# Patient Record
Sex: MALE | Race: OTHER | Employment: FULL TIME | URBAN - METROPOLITAN AREA
[De-identification: names, ages, dates, MRNs, and addresses within clinical notes are randomized per-mention and may not be internally consistent; named-entity substitution may affect disease eponyms.]

---

## 2020-02-26 ENCOUNTER — HOSPITAL ENCOUNTER (OUTPATIENT)
Age: 40
Discharge: HOME | End: 2020-02-26

## 2020-02-26 PROCEDURE — 36415 COLL VENOUS BLD VENIPUNCTURE: CPT

## 2021-01-25 ENCOUNTER — HOSPITAL ENCOUNTER (EMERGENCY)
Age: 41
Discharge: HOME | End: 2021-01-25
Attending: EMERGENCY MEDICINE
Payer: OTHER GOVERNMENT

## 2021-01-25 ENCOUNTER — NURSE TRIAGE (OUTPATIENT)
Dept: OTHER | Facility: CLINIC | Age: 41
End: 2021-01-25

## 2021-01-25 ENCOUNTER — APPOINTMENT (OUTPATIENT)
Dept: ULTRASOUND IMAGING | Age: 41
End: 2021-01-25
Payer: OTHER GOVERNMENT

## 2021-01-25 VITALS
DIASTOLIC BLOOD PRESSURE: 70 MMHG | HEIGHT: 69 IN | WEIGHT: 210 LBS | SYSTOLIC BLOOD PRESSURE: 126 MMHG | BODY MASS INDEX: 31.1 KG/M2 | OXYGEN SATURATION: 96 % | TEMPERATURE: 98.6 F | HEART RATE: 80 BPM | RESPIRATION RATE: 16 BRPM

## 2021-01-25 DIAGNOSIS — M79.605 LEFT LEG PAIN: Primary | ICD-10-CM

## 2021-01-25 PROCEDURE — 93971 EXTREMITY STUDY: CPT

## 2021-01-25 PROCEDURE — 99283 EMERGENCY DEPT VISIT LOW MDM: CPT

## 2021-01-25 ASSESSMENT — PAIN DESCRIPTION - PAIN TYPE: TYPE: ACUTE PAIN

## 2021-01-25 NOTE — ED PROVIDER NOTES
[id number] [facility] ED  [address] [facility] [address]  Phone: [phone]  Fax: [phone]      eMERGENCY dEPARTMENT eNCOUnter      Pt Name: Dia Golden  MRN: [de-identified]  [place] 1980  Date of evaluation: 1/25/21      CHIEF COMPLAINT:  Chief Complaint   Patient presents with    Leg Pain     left leg pain for about 1 month, patient states pain is behind his knee, going into his calf and back thigh       HISTORY OF PRESENT ILLNESS    Dia Golden is a 46 y.o. male who presents with evaluation for orthopedic pain:    Location/Symptom:   L knee/leg pain  Timing/Onset:  ~ 1 month  Context/Setting:    Patient here at recommendation of PCP after he called her office to schedule appointment for approximately 1 months worth of atraumatic left posterior knee pain with some radiation to the calf and to the lower posterior thigh. Patient denies seeing any swelling or redness of this area. Patient denies any previous DVT or PE history. Patient has no pertinent orthopedic history of this left lower extremity. No paresthesias/focal weakness. He denies any present or preceding significant cardiac or respiratory symptoms. He denies any coagulopathy history, or recent history of travel or surgical procedures    Quality:   Achy  Duration:   constant  Modifying Factors: Worse with movement and weightbearing, better with rest  Severity:   Mild-moderate    Nursing Notes were reviewed. REVIEW OF SYSTEMS       Constitutional: Denies recent fever, chills. Eyes: No vision changes. Neck: No neck pain. Respiratory: Denies recent shortness of breath. Cardiac:  Denies recent chest pain. GI:  Denies abdominal pain/nausea/vomiting/diarrhea. : Denies dysuria. Musculoskeletal:   Per HPI  Neurologic:  No headache. No focal weakness. No paresthesias. Skin:  Denies any rash. Negative in 10 essential Systems except as mentioned above and in the HPI.       PAST MEDICAL HISTORY   PMH:  has no past medical history on file. Surgical History:  has no past surgical history on file. Social History:  reports that he has never smoked. He has never used smokeless tobacco. He reports that he does not use drugs. Family History: None  Psychiatric History: None    Allergies:has No Known Allergies. PHYSICAL EXAM     INITIAL VITALS: /70   Pulse 80   Temp 98.6 °F (37 °C) (Oral)   Resp 16   Ht 5' 9\" (1.753 m)   Wt 95.3 kg (210 lb)   SpO2 96%   BMI 31.01 kg/m²   Constitutional:  Well developed   Eyes:  Pupils equal/round  HENT:  Atraumatic, external ears normal, nose normal  Respiratory:  LCTA bilat, no W/R/R  Cardiovascular:  RRR with normal S1 and S2    Musculoskeletal:  TTP of left posterior knee generally, this extended to posteromedial calf as well. No erythema/edema/palpable chord/ecchymosis or warmth. 2+ DP on left. No anterior left knee TTP. NV intact distally. Integument:   No rash. Neurologic:  Alert & appropriate mentation/interaction, no focal deficits noted       DIAGNOSTIC RESULTS     EKG: All EKG's are interpreted by the Emergency Department Physician who either signs or Co-signs this chart in the absence of a cardiologist.  Not indicated    RADIOLOGY:   Reviewed the radiologist:  US DUP LOWER EXTREMITY LEFT KERRI   Final Result   No evidence of DVT in the left lower extremity. LABS:  Labs Reviewed - No data to display  Not indicated    EMERGENCY DEPARTMENT COURSE / MDM:     [id number]  Kerri Doppler ordered for LLE. TTP on exam but otherwise no signs or risk factors of DVT. [id number]  Attending discharged this patient after discussing all labwork/imaging results that were finalized. Treatment plan and recommended follow-up discussed with them as well. No orders of the defined types were placed in this encounter. CONSULTS:  None      FINAL IMPRESSION      1.  Left leg pain          DISPOSITION/PLAN:  DISPOSITION          PATIENT REFERRED TO:  No follow-up provider specified.     DISCHARGE MEDICATIONS:  New Prescriptions    No medications on file       (Please note that portions of this note were completed with a voice recognition program.  Efforts were made to edit the dictations but occasionally words are mis-transcribed.)    HAI Marcus PA-C  01/25/21 1502

## 2021-01-25 NOTE — TELEPHONE ENCOUNTER
makes it appear as pt is not [facility] ([facility]) PCP patient. Advised pt to seek care in [address]; pt v/u and is going to go to [facility]) ED. Care advice provided, patient verbalizes understanding; denies any other questions or concerns; instructed to call back for any new or worsening symptoms. Attention Provider: Thank you for allowing me to participate in the care of your patient. The patient was connected to triage in response to information provided to the [facility]. Please do not respond through this encounter as the response is not directed to a shared pool.

## 2021-01-25 NOTE — ED PROVIDER NOTES
[id number] [facility] ED  [address] [facility] [address]  Phone: [phone]  Fax: [phone]      Attending Physician Attestation    I performed a history and physical examination of the patient and discussed management with the mid level provider. I reviewed the mid level provider's note and agree with the documented findings and plan of care. Any areas of disagreement are noted on the chart. I was personally present for the key portions of any procedures. I have documented in the chart those procedures where I was not present during the key portions. I have reviewed the emergency nurses triage note. I agree with the chief complaint, past medical history, past surgical history, allergies, medications, social and family history as documented unless otherwise noted below. Documentation of the HPI, Physical Exam and Medical Decision Making performed by mid level providers is based on my personal performance of the HPI, PE and MDM. For Physician Assistant/ Nurse Practitioner cases/documentation I have personally evaluated this patient and have completed at least one if not all key elements of the E/M (history, physical exam, and MDM). Additional findings are as noted. CHIEF COMPLAINT       Chief Complaint   Patient presents with    Leg Pain     left leg pain for about 1 month, patient states pain is behind his knee, going into his calf and back thigh       DIAGNOSTIC RESULTS     LABS:  Labs Reviewed - No data to display    All other labs were within normal range or not returned as of this dictation. RADIOLOGY:  US DUP LOWER EXTREMITY LEFT KERRI   Final Result   No evidence of DVT in the left lower extremity.                EMERGENCY DEPARTMENT COURSE:   Vitals:    Vitals:    01/25/21 1606   BP: 126/70   Pulse: 80   Resp: 16   Temp: 98.6 °F (37 °C)   TempSrc: Oral   SpO2: 96%   Weight: 95.3 kg (210 lb)   Height: 5' 9\" (1.753 m)     -------------------------  BP: 126/70, Temp: 98.6 °F (37 °C), Pulse: 80, Resp: 16             PERTINENT ATTENDING PHYSICIAN COMMENTS:    42-year-old male here with left calf pain. Patient was referred here by his primary care doctor for evaluation for DVT. Patient denies history of venous thromboembolism. No recent travel. No recent surgery. No history of malignancy. No chest pains or shortness of breath. On physical exam, he does have calf tenderness to palpation, but no swelling noted. We did obtain a venous Doppler, which was negative for DVT.       (Please note that portions of this note were completed with a voice recognition program.  Efforts were made to edit the dictations but occasionally words are mis-transcribed.)    Tyshawn Lee MD  Attending Emergency Medicine Physician        Tyshawn Lee MD  01/25/21 9178

## 2021-01-29 ENCOUNTER — OFFICE VISIT (OUTPATIENT)
Dept: PRIMARY CARE CLINIC | Age: 41
End: 2021-01-29
Payer: OTHER GOVERNMENT

## 2021-01-29 VITALS
WEIGHT: 228 LBS | DIASTOLIC BLOOD PRESSURE: 78 MMHG | OXYGEN SATURATION: 97 % | TEMPERATURE: 98.2 F | BODY MASS INDEX: 34.56 KG/M2 | SYSTOLIC BLOOD PRESSURE: 124 MMHG | HEIGHT: 68 IN | HEART RATE: 75 BPM

## 2021-01-29 DIAGNOSIS — Z00.00 ENCOUNTER FOR WELL ADULT EXAM WITHOUT ABNORMAL FINDINGS: Primary | ICD-10-CM

## 2021-01-29 DIAGNOSIS — Z13.220 SCREENING FOR HYPERLIPIDEMIA: ICD-10-CM

## 2021-01-29 DIAGNOSIS — Z13.1 SCREENING FOR DIABETES MELLITUS: ICD-10-CM

## 2021-01-29 DIAGNOSIS — Z11.59 ENCOUNTER FOR HCV SCREENING TEST FOR LOW RISK PATIENT: ICD-10-CM

## 2021-01-29 PROCEDURE — 99385 PREV VISIT NEW AGE 18-39: CPT | Performed by: NURSE PRACTITIONER

## 2021-01-29 SDOH — ECONOMIC STABILITY: TRANSPORTATION INSECURITY
IN THE PAST 12 MONTHS, HAS THE LACK OF TRANSPORTATION KEPT YOU FROM MEDICAL APPOINTMENTS OR FROM GETTING MEDICATIONS?: NOT ASKED

## 2021-01-29 SDOH — ECONOMIC STABILITY: FOOD INSECURITY: WITHIN THE PAST 12 MONTHS, YOU WORRIED THAT YOUR FOOD WOULD RUN OUT BEFORE YOU GOT MONEY TO BUY MORE.: NEVER TRUE

## 2021-01-29 SDOH — ECONOMIC STABILITY: FOOD INSECURITY: WITHIN THE PAST 12 MONTHS, THE FOOD YOU BOUGHT JUST DIDN'T LAST AND YOU DIDN'T HAVE MONEY TO GET MORE.: NEVER TRUE

## 2021-01-29 SDOH — HEALTH STABILITY: MENTAL HEALTH: HOW MANY STANDARD DRINKS CONTAINING ALCOHOL DO YOU HAVE ON A TYPICAL DAY?: 5 OR 6

## 2021-01-29 ASSESSMENT — ENCOUNTER SYMPTOMS
ALLERGIC/IMMUNOLOGIC NEGATIVE: 1
EYES NEGATIVE: 1
RESPIRATORY NEGATIVE: 1
GASTROINTESTINAL NEGATIVE: 1

## 2021-01-29 ASSESSMENT — PATIENT HEALTH QUESTIONNAIRE - PHQ9
SUM OF ALL RESPONSES TO PHQ QUESTIONS 1-9: 0
SUM OF ALL RESPONSES TO PHQ QUESTIONS 1-9: 0

## 2021-01-29 NOTE — PROGRESS NOTES
[facility] [address] [place] [address]  [place] [address]  Dept: [phone]     Patient is here to establish care. Here for evaluation of the following medical concerns:New Patient    Racquel Trujillo is a 46 y.o. male who presents to the office today for a first visit and to establish a relationship with a new primary care provider. Pt here to establish care. No chronic medical conditions, no daily medications. UTD on all vaccines. Has not had labwork in at least a few years, mentioned liver enzymes were elevated about 5 years ago. No other concerns today, will get lab work. There are no active problems to display for this patient. Patient's BMI is Body mass index is 34.67 kg/m². Social History     Tobacco Use    Smoking status: Never Smoker    Smokeless tobacco: Never Used   Substance Use Topics    Alcohol use: Yes     Alcohol/week: 6.0 standard drinks     Types: 6 Cans of beer per week     Frequency: 2-3 times a week     Drinks per session: 5 or 6    Drug use: Never      History reviewed. No pertinent past medical history. PHQ-9 Total Score: 0 (1/29/2021  1:10 PM)     Immunization:  Immunization History   Administered Date(s) Administered    Influenza Virus Vaccine 10/01/2020    Tdap (Boostrix, Adacel) 10/01/2016     History reviewed. No pertinent surgical history. Family History   Problem Relation Age of Onset    High Cholesterol Father     Heart Disease Father      No current outpatient medications on file. No current facility-administered medications for this visit. The patient's past medical, surgical, social, and family history as well as his current medications and allergies were reviewed as documented in today's encounter. Review of Systems   Constitutional: Negative. HENT: Negative. Eyes: Negative. Respiratory: Negative. Cardiovascular: Negative. Gastrointestinal: Negative. Endocrine: Negative.     Genitourinary: Negative. Musculoskeletal: Negative. Skin: Negative. Allergic/Immunologic: Negative. Neurological: Negative. Hematological: Negative. Psychiatric/Behavioral: Negative. All other systems reviewed and are negative. /78 (Site: Right Upper Arm, Position: Sitting, Cuff Size: Medium Adult)   Pulse 75   Temp 98.2 °F (36.8 °C) (Temporal)   Ht 5' 8\" (1.727 m)   Wt 228 lb (103.4 kg)   SpO2 97%   BMI 34.67 kg/m²   Physical Exam  Vitals signs and nursing note reviewed. Constitutional:       Appearance: Normal appearance. He is normal weight. HENT:      Right Ear: Tympanic membrane, ear canal and external ear normal.      Left Ear: Tympanic membrane, ear canal and external ear normal.      Nose: Nose normal.      Mouth/Throat:      Mouth: Mucous membranes are moist.      Pharynx: Oropharynx is clear. Eyes:      Extraocular Movements: Extraocular movements intact. Conjunctiva/sclera: Conjunctivae normal.      Pupils: Pupils are equal, round, and reactive to light. Neck:      Musculoskeletal: Normal range of motion. Cardiovascular:      Rate and Rhythm: Normal rate and regular rhythm. Pulses: Normal pulses. Heart sounds: Normal heart sounds. Pulmonary:      Effort: Pulmonary effort is normal.      Breath sounds: Normal breath sounds. Abdominal:      General: Abdomen is flat. Bowel sounds are normal.      Palpations: Abdomen is soft. Skin:     General: Skin is warm and dry. Capillary Refill: Capillary refill takes less than 2 seconds. Neurological:      General: No focal deficit present. Mental Status: He is alert.        No results found for: WBC, HGB, HCT, MCV, PLT  No results found for: NA, K, CL, CO2, BUN, CREATININE, GLUCOSE, CALCIUM   No results found for: ALT, AST, GGT, ALKPHOS, BILITOT  No results found for: TSHFT4, TSH  No results found for: CHOL  No results found for: TRIG  No results found for: HDL  No results found for: LDLCALC, LDLCHOLESTEROL  No results found for: CHOLHDLRATIO  No results found for: LABA1C  No results found for: ACTMLFNN32  No results found for: FOLATE  No results found for: IRON, TIBC, FERRITIN  No results found for: VITD25  I personally reviewed testing with patient. Otherwise labs within normal limits  ASSESSMENT AND PLAN  1. Screening for diabetes mellitus    - Comprehensive Metabolic Panel, Fasting; Future    2. Screening for hyperlipidemia    - Lipid Panel; Future    3. Encounter for HCV screening test for low risk patient    - Hepatitis C Antibody; Future    4. Encounter for well adult exam without abnormal findings       Orders Placed This Encounter   Procedures    Hepatitis C Antibody     Standing Status:   Future     Standing Expiration Date:   1/29/2022    Lipid Panel     Standing Status:   Future     Standing Expiration Date:   1/29/2022     Order Specific Question:   Is Patient Fasting?/# of Hours     Answer:   10 hours fasting    Comprehensive Metabolic Panel, Fasting     Standing Status:   Future     Standing Expiration Date:   1/29/2022     No orders of the defined types were placed in this encounter. Data Unavailable  Health Maintenance Due   Topic Date Due    Hepatitis C screen  1980      There are no discontinued medications. The patient's past medical, surgical, social, and family history as well as his   current medications and allergies were reviewed as documented in today's encounter. Medications, labs, diagnostic studies, consultations and follow-up as documented in this encounter. Return in about 1 year (around 1/29/2022). No future appointments. This note was completed by using the assistance of a speech-recognition program. However, inadvertent computerized transcription errors may be present. Although every effort was made to ensure accuracy, no guarantees can be provided that every mistake has been identified and corrected by editing.   Electronically signed by [facility] Alex Flores CNP on 1/29/2021 at 1:46 PM

## 2021-01-30 ENCOUNTER — HOSPITAL ENCOUNTER (OUTPATIENT)
Age: 41
Discharge: HOME | End: 2021-01-30
Payer: OTHER GOVERNMENT

## 2021-01-30 DIAGNOSIS — Z13.1 SCREENING FOR DIABETES MELLITUS: ICD-10-CM

## 2021-01-30 DIAGNOSIS — Z13.220 SCREENING FOR HYPERLIPIDEMIA: ICD-10-CM

## 2021-01-30 DIAGNOSIS — Z11.59 ENCOUNTER FOR HCV SCREENING TEST FOR LOW RISK PATIENT: ICD-10-CM

## 2021-01-30 LAB
ALBUMIN SERPL-MCNC: 4.3 G/DL (ref 3.5–5.2)
ALBUMIN/GLOBULIN RATIO: 1.4 (ref 1–2.5)
ALP BLD-CCNC: 51 U/L (ref 40–129)
ALT SERPL-CCNC: 25 U/L (ref 5–41)
ANION GAP SERPL CALCULATED.3IONS-SCNC: 5 MMOL/L (ref 9–17)
AST SERPL-CCNC: 26 U/L
BILIRUB SERPL-MCNC: 0.41 MG/DL (ref 0.3–1.2)
BUN BLDV-MCNC: 21 MG/DL (ref 6–20)
BUN/CREAT BLD: ABNORMAL (ref 9–20)
CALCIUM SERPL-MCNC: 9.9 MG/DL (ref 8.6–10.4)
CHLORIDE BLD-SCNC: 102 MMOL/L (ref 98–107)
CHOLESTEROL/HDL RATIO: 3.2
CHOLESTEROL: 152 MG/DL
CO2: 26 MMOL/L (ref 20–31)
CREAT SERPL-MCNC: 1.01 MG/DL (ref 0.7–1.2)
GFR AFRICAN AMERICAN: >60 ML/MIN
GFR NON-AFRICAN AMERICAN: >60 ML/MIN
GFR SERPL CREATININE-BSD FRML MDRD: ABNORMAL ML/MIN/{1.73_M2}
GFR SERPL CREATININE-BSD FRML MDRD: ABNORMAL ML/MIN/{1.73_M2}
GLUCOSE FASTING: 94 MG/DL (ref 70–99)
HDLC SERPL-MCNC: 48 MG/DL
HEPATITIS C ANTIBODY: NONREACTIVE
LDL CHOLESTEROL: 88 MG/DL (ref 0–130)
POTASSIUM SERPL-SCNC: 5 MMOL/L (ref 3.7–5.3)
SODIUM BLD-SCNC: 133 MMOL/L (ref 135–144)
TOTAL PROTEIN: 7.3 G/DL (ref 6.4–8.3)
TRIGL SERPL-MCNC: 80 MG/DL
VLDLC SERPL CALC-MCNC: NORMAL MG/DL (ref 1–30)

## 2021-01-30 PROCEDURE — 80061 LIPID PANEL: CPT

## 2021-01-30 PROCEDURE — 80053 COMPREHEN METABOLIC PANEL: CPT

## 2021-01-30 PROCEDURE — 36415 COLL VENOUS BLD VENIPUNCTURE: CPT

## 2021-01-30 PROCEDURE — 86803 HEPATITIS C AB TEST: CPT

## 2021-03-28 ENCOUNTER — HOSPITAL ENCOUNTER (EMERGENCY)
Age: 41
Discharge: HOME | End: 2021-03-28
Attending: EMERGENCY MEDICINE
Payer: OTHER GOVERNMENT

## 2021-03-28 VITALS
OXYGEN SATURATION: 100 % | TEMPERATURE: 98.1 F | RESPIRATION RATE: 14 BRPM | SYSTOLIC BLOOD PRESSURE: 137 MMHG | HEART RATE: 74 BPM | BODY MASS INDEX: 32.29 KG/M2 | WEIGHT: 218 LBS | DIASTOLIC BLOOD PRESSURE: 82 MMHG | HEIGHT: 69 IN

## 2021-03-28 DIAGNOSIS — R36.0 PENILE DISCHARGE, WITHOUT BLOOD: Primary | ICD-10-CM

## 2021-03-28 DIAGNOSIS — N39.0 URINARY TRACT INFECTION WITHOUT HEMATURIA, SITE UNSPECIFIED: ICD-10-CM

## 2021-03-28 LAB
-: ABNORMAL
AMORPHOUS: ABNORMAL
BACTERIA: ABNORMAL
BILIRUBIN URINE: ABNORMAL
CASTS UA: ABNORMAL /LPF
COLOR: ABNORMAL
COMMENT UA: ABNORMAL
CRYSTALS, UA: ABNORMAL /HPF
EPITHELIAL CELLS UA: ABNORMAL /HPF (ref 0–5)
GLUCOSE URINE: ABNORMAL
KETONES, URINE: NEGATIVE
LEUKOCYTE ESTERASE, URINE: ABNORMAL
MUCUS: ABNORMAL
NITRITE, URINE: POSITIVE
OTHER OBSERVATIONS UA: ABNORMAL
PH UA: 5 (ref 5–8)
PROTEIN UA: ABNORMAL
RBC UA: ABNORMAL /HPF (ref 0–2)
RENAL EPITHELIAL, UA: ABNORMAL /HPF
SPECIFIC GRAVITY UA: 1.02 (ref 1–1.03)
TRICHOMONAS: ABNORMAL
TURBIDITY: CLEAR
URINE HGB: NEGATIVE
UROBILINOGEN, URINE: ABNORMAL
WBC UA: ABNORMAL /HPF (ref 0–5)
YEAST: ABNORMAL

## 2021-03-28 PROCEDURE — 81001 URINALYSIS AUTO W/SCOPE: CPT

## 2021-03-28 PROCEDURE — 96372 THER/PROPH/DIAG INJ SC/IM: CPT

## 2021-03-28 PROCEDURE — 87491 CHLMYD TRACH DNA AMP PROBE: CPT

## 2021-03-28 PROCEDURE — 99283 EMERGENCY DEPT VISIT LOW MDM: CPT

## 2021-03-28 PROCEDURE — 6370000000 HC RX 637 (ALT 250 FOR IP): Performed by: PHYSICIAN ASSISTANT

## 2021-03-28 PROCEDURE — 6360000002 HC RX W HCPCS: Performed by: PHYSICIAN ASSISTANT

## 2021-03-28 RX ORDER — CEFTRIAXONE 500 MG/1
500 INJECTION, POWDER, FOR SOLUTION INTRAMUSCULAR; INTRAVENOUS ONCE
Status: COMPLETED | OUTPATIENT
Start: 2021-03-28 | End: 2021-03-28

## 2021-03-28 RX ORDER — DOXYCYCLINE HYCLATE 100 MG
100 TABLET ORAL 2 TIMES DAILY
Qty: 13 TABLET | Refills: 0 | Status: SHIPPED | OUTPATIENT
Start: 2021-03-28 | End: 2021-04-04

## 2021-03-28 RX ORDER — DOXYCYCLINE HYCLATE 100 MG
100 TABLET ORAL ONCE
Status: COMPLETED | OUTPATIENT
Start: 2021-03-28 | End: 2021-03-28

## 2021-03-28 RX ADMIN — CEFTRIAXONE SODIUM 500 MG: 500 INJECTION, POWDER, FOR SOLUTION INTRAMUSCULAR; INTRAVENOUS at 13:35

## 2021-03-28 RX ADMIN — DOXYCYCLINE HYCLATE 100 MG: 100 TABLET, COATED ORAL at 13:35

## 2021-03-28 NOTE — ED PROVIDER NOTES
[address] [facility] ED  [address] [facility] [address]  Phone: [phone]  Fax: [phone]      eMERGENCY dEPARTMENT eNCOUnter      Pt Name: Amaya Anderson  MRN: [de-identified]  [place] 1980  Date of evaluation: 3/28/21      CHIEF COMPLAINT:  Chief Complaint   Patient presents with    Penile Discharge     white- denies dysuria- ongoing 3 days       HISTORY OF PRESENT ILLNESS    Amaya Anderson is a 46 y.o. male who presents with STI EXPOSURE/SYMPTOMS:     Location/Symptom:     Discharge? YES  Hx STI? YES    Dysuria? NO  Rash? NO  Abd pain? NO  Back pain? NO    Timing/Onset:   3-4 days  Context/Setting:     Here with penile discharge, white/thick over the last 3-4 days. Started using Pyridium yesterday. No dysuria/hematuria. Has had Chlamydia infections previously. No other abd/back/stooling symptoms. No other complaints. Quality:   painless  Duration:   Intermittent, during urination  Modifying Factors:   urination  Severity:   Mild-moderate    Nursing Notes were reviewed. REVIEW OF SYSTEMS       Constitutional: Denies recent fever, chills. Eyes: No visual changes. Neck: No neck pain. Respiratory: Denies recent shortness of breath. Cardiac:  Denies recent chest pain. GI:  Per HPI  :   Per HPI  Musculoskeletal: Denies focal weakness. Neurologic: Denies headache or focal weakness. Skin:  Per HPI    Negative in 10 essential Systems except as mentioned above and in the HPI. PAST MEDICAL HISTORY   PMH:  has no past medical history on file. Surgical History:  has no past surgical history on file. Social History:  reports that he has never smoked. He has never used smokeless tobacco. He reports current alcohol use of about 6.0 standard drinks of alcohol per week. He reports that he does not use drugs. Family History: None  Psychiatric History: None    Allergies:has No Known Allergies.       PHYSICAL EXAM     INITIAL VITALS: /82   Pulse 74
following components:    Bacteria, UA FEW (*)     Mucus, UA 1+ (*)     Other Observations UA   (*)     Value: Utilizing a urinalysis as the only screening method to exclude a potential uropathogen can be unreliable in many patient populations. Rapid screening tests are less sensitive than culture and if UTI is a clinical possibility, culture should be considered despite a negative urinalysis. All other components within normal limits   CHLAMYDIA TRACHOMATIS DNA, URINE         PERTINENT ATTENDING PHYSICIAN COMMENTS:    The patient presents with penile discharge for the past 3 days. He also reports hematuria and dysuria. He is concerned about an STI. He started using Pyridium yesterday. He denies sore throat or eye discharge. On exam, the patient appears to be no distress. He has no CVA tenderness abdominal discomfort. Urogenital exam was deferred. The patient had urine sent for gonorrhea and chlamydia. We will treat him STIs. Doxycycline was prescribed for outpatient treatment. He is discharged in good condition.          Maria Del Carmen Fleming MD  03/28/21 1600

## 2021-03-29 LAB
C. TRACHOMATIS DNA ,URINE: NEGATIVE
SPECIMEN DESCRIPTION: NORMAL

## 2021-04-22 ENCOUNTER — HOSPITAL ENCOUNTER (OUTPATIENT)
Age: 41
Setting detail: SPECIMEN
Discharge: HOME | End: 2021-04-22
Payer: OTHER GOVERNMENT

## 2021-04-22 ENCOUNTER — OFFICE VISIT (OUTPATIENT)
Dept: PRIMARY CARE CLINIC | Age: 41
End: 2021-04-22
Payer: OTHER GOVERNMENT

## 2021-04-22 ENCOUNTER — NURSE TRIAGE (OUTPATIENT)
Dept: OTHER | Facility: CLINIC | Age: 41
End: 2021-04-22

## 2021-04-22 VITALS
OXYGEN SATURATION: 97 % | DIASTOLIC BLOOD PRESSURE: 82 MMHG | HEART RATE: 78 BPM | WEIGHT: 232 LBS | SYSTOLIC BLOOD PRESSURE: 134 MMHG | BODY MASS INDEX: 34.36 KG/M2 | HEIGHT: 69 IN

## 2021-04-22 DIAGNOSIS — Z80.42 FAMILY HISTORY OF PROSTATE CANCER: ICD-10-CM

## 2021-04-22 DIAGNOSIS — R30.0 DYSURIA: ICD-10-CM

## 2021-04-22 DIAGNOSIS — R36.9 PENILE DISCHARGE: Primary | ICD-10-CM

## 2021-04-22 DIAGNOSIS — R36.9 PENILE DISCHARGE: ICD-10-CM

## 2021-04-22 LAB
BILIRUBIN, POC: NORMAL
BLOOD URINE, POC: NORMAL
CLARITY, POC: CLEAR
COLOR, POC: YELLOW
GLUCOSE URINE, POC: NORMAL
KETONES, POC: NORMAL
LEUKOCYTE EST, POC: NORMAL
NITRITE, POC: NORMAL
PH, POC: 6
PROSTATE SPECIFIC ANTIGEN: 0.71 UG/L
PROTEIN, POC: NORMAL
SPECIFIC GRAVITY, POC: 1.02
UROBILINOGEN, POC: NORMAL

## 2021-04-22 PROCEDURE — 99213 OFFICE O/P EST LOW 20 MIN: CPT | Performed by: NURSE PRACTITIONER

## 2021-04-22 PROCEDURE — 81003 URINALYSIS AUTO W/O SCOPE: CPT | Performed by: NURSE PRACTITIONER

## 2021-04-22 ASSESSMENT — ENCOUNTER SYMPTOMS
RESPIRATORY NEGATIVE: 1
GASTROINTESTINAL NEGATIVE: 1
EYES NEGATIVE: 1
ALLERGIC/IMMUNOLOGIC NEGATIVE: 1

## 2021-04-22 NOTE — PROGRESS NOTES
[facility] [address] [place] [address]  [place] [address]  Dept: [phone]       Angela Resendiz is a 46 y.o. male who presents to the office today for evaluation of Penile Discharge    Pt is here with concerns of penile discharge as well as dysuria. He was seen at ER end of march and treated for UTI, tested for chlamydia which was negative, was not tested for [address] He reports symptoms resolved after treatment from ER, but have returned in last few days. He is sexually active with one partner, but notes he hasn't been sexually active for the last 2 months. Partner denies symptoms. Denies abd pain, fever, n/v, denies blood in urine. Describes penile discharge as a milky discharge that he is able to milk from the penis. His father was diagnosed with prostate cancer at age 61. Discussed getting urine culture and STD testing as well as PSA blood, pt agreeable. Will f/u with pt once we get results. Discussed continuing to abstain from sexual activity until we get results, pt verbalizes understanding. Patient Active Problem List    Diagnosis Date Noted    Family history of prostate cancer 04/22/2021     Patient's BMI is Body mass index is 34.26 kg/m². Social History     Tobacco Use    Smoking status: Never Smoker    Smokeless tobacco: Never Used   Substance Use Topics    Alcohol use: Yes     Alcohol/week: 6.0 standard drinks     Types: 6 Cans of beer per week     Frequency: 2-3 times a week     Drinks per session: 5 or 6    Drug use: Never      History reviewed. No pertinent past medical history. No data recorded   Immunization:  Immunization History   Administered Date(s) Administered    Influenza Virus Vaccine 10/01/2020    Tdap (Boostrix, Adacel) 10/01/2016     History reviewed. No pertinent surgical history.   Family History   Problem Relation Age of Onset    High Cholesterol Father     Heart Disease Father     Prostate Cancer Father      No current outpatient medications on file. No current facility-administered medications for this visit. The patient's past medical, surgical, social, and family history as well as his current medications and allergies were reviewed as documented in today's encounter. Review of Systems   Constitutional: Negative. HENT: Negative. Eyes: Negative. Respiratory: Negative. Cardiovascular: Negative. Gastrointestinal: Negative. Endocrine: Negative. Genitourinary: Positive for discharge and dysuria. Negative for difficulty urinating, frequency, genital sores, hematuria, penile pain, penile swelling, scrotal swelling and testicular pain. Musculoskeletal: Negative. Skin: Negative. Allergic/Immunologic: Negative. Neurological: Negative. Hematological: Negative. Psychiatric/Behavioral: Negative. All other systems reviewed and are negative. /82 (Site: Left Upper Arm, Position: Sitting, Cuff Size: Large Adult)   Pulse 78   Ht 5' 9\" (1.753 m)   Wt 232 lb (105.2 kg)   SpO2 97%   BMI 34.26 kg/m²   Physical Exam  Vitals signs and nursing note reviewed. Constitutional:       Appearance: Normal appearance. He is normal weight. HENT:      Nose: Nose normal.      Mouth/Throat:      Mouth: Mucous membranes are moist.      Pharynx: Oropharynx is clear. Eyes:      Extraocular Movements: Extraocular movements intact. Conjunctiva/sclera: Conjunctivae normal.      Pupils: Pupils are equal, round, and reactive to light. Neck:      Musculoskeletal: Normal range of motion. Cardiovascular:      Rate and Rhythm: Normal rate and regular rhythm. Pulses: Normal pulses. Heart sounds: Normal heart sounds. Pulmonary:      Effort: Pulmonary effort is normal.      Breath sounds: Normal breath sounds. Abdominal:      General: Abdomen is flat. Bowel sounds are normal.      Palpations: Abdomen is soft. Skin:     General: Skin is warm and dry.       Capillary Refill: Capillary refill takes less than 2 seconds. Neurological:      General: No focal deficit present. Mental Status: He is alert. No results found for: WBC, HGB, HCT, MCV, PLT  Lab Results   Component Value Date     01/30/2021    K 5.0 01/30/2021     01/30/2021    CO2 26 01/30/2021    BUN 21 01/30/2021    CREATININE 1.01 01/30/2021    CALCIUM 9.9 01/30/2021      Lab Results   Component Value Date    ALT 25 01/30/2021    AST 26 01/30/2021    ALKPHOS 51 01/30/2021    BILITOT 0.41 01/30/2021     No results found for: TSHFT4, TSH  Lab Results   Component Value Date    CHOL 152 01/30/2021     Lab Results   Component Value Date    TRIG 80 01/30/2021     Lab Results   Component Value Date    HDL 48 01/30/2021     Lab Results   Component Value Date    LDLCHOLESTEROL 88 01/30/2021     Lab Results   Component Value Date    CHOLHDLRATIO 3.2 01/30/2021     No results found for: LABA1C  No results found for: [id number]  No results found for: FOLATE  No results found for: IRON, TIBC, FERRITIN  No results found for: VITD25  I personally reviewed testing with patient. Otherwise labs within normal limits  ASSESSMENT AND PLAN  1. Penile discharge    - Chlamydia/GC DNA, Urine; Future  - Culture, Urine; Future  - POCT Urinalysis No Micro (Auto)  - Trichomonas Vaginali, Molecular; Future    2. Dysuria    - Chlamydia/GC DNA, Urine; Future  - Culture, Urine; Future  - PSA Screening; Future  - Trichomonas Vaginali, Molecular; Future    3. Family history of prostate cancer    - PSA Screening; Future     Orders Placed This Encounter   Procedures    Chlamydia/GC DNA, Urine     Standing Status:   Future     Number of Occurrences:   1     Standing Expiration Date:   4/22/2022    Culture, Urine     Standing Status:   Future     Number of Occurrences:   1     Standing Expiration Date:   4/22/2022     Order Specific Question:   Specify (ex-cath, midstream, cysto, etc)?      Answer:   midstream    Trichomonas Vaginali, Molecular     Standing Status:   Future     Number of Occurrences:   1     Standing Expiration Date:   4/22/2022    PSA Screening     Standing Status:   Future     Number of Occurrences:   1     Standing Expiration Date:   4/22/2022    POCT Urinalysis No Micro (Auto)     No orders of the defined types were placed in this encounter. Data Unavailable  Health Maintenance Due   Topic Date Due    COVID-19 Vaccine (1) Never done      There are no discontinued medications. The patient's past medical, surgical, social, and family history as well as his   current medications and allergies were reviewed as documented in today's encounter. Medications, labs, diagnostic studies, consultations and follow-up as documented in this encounter. No follow-ups on file. No future appointments. This note was completed by using the assistance of a speech-recognition program. However, inadvertent computerized transcription errors may be present. Although every effort was made to ensure accuracy, no guarantees can be provided that every mistake has been identified and corrected by editing.   Electronically signed by MAXIMUS Alarcon CNP on 4/22/2021 at 1:45 PM

## 2021-04-22 NOTE — TELEPHONE ENCOUNTER
Received call from Adela Goodwin at pre-service [facility] with [employer]. Brief description of triage: penile discharge    Triage indicates for patient to be seen today. Pt advised if no appts available to go to walkin//ER for evaluation. Care advice provided, patient verbalizes understanding; denies any other questions or concerns; instructed to call back for any new or worsening symptoms. Writer provided warm transfer to [facility] at [facility] for appointment scheduling. Attention Provider: Thank you for allowing me to participate in the care of your patient. The patient was connected to triage in response to information provided to the [facility]. Please do not respond through this encounter as the response is not directed to a shared pool. Reason for Disposition   Pus (white, yellow) or bloody discharge from end of penis    Answer Assessment - Initial Assessment Questions  1. SYMPTOM: \"What's the main symptom you're concerned about? \" (e.g., discharge from penis, rash, pain, itching, swelling)  \"Milky discharge from penis\"  2. LOCATION: \"Where is the  located? \"   NO pain  3. ONSET: \"When did   start? \"  Seen in ER 3 weeks ago, took and completed ABX with sx resolvement. SX have returned. 4. PAIN: \"Is there any pain? \" If so, ask: \"How bad is it? \"  (Scale 1-10; or mild, moderate, severe)     Denies  5. URINE: D[place] Standing difficulty passing urine? \" If so, ask: \"When was the last time? \"     Denies   6. CAUSE: \"What do you think is causing the symptoms? \"   dx w/ Urethratitis  7. OTHER SYMPTOMS: \"Do you have any other symptoms? \" (e.g., fever, abdominal pain, blood in urine)  Denies.     Protocols used: PENIS AND SCROTUM [facility]

## 2021-04-23 LAB
C. TRACHOMATIS DNA ,URINE: NEGATIVE
CULTURE: NO GROWTH
Lab: NORMAL
N. GONORRHOEAE DNA, URINE: NEGATIVE
SOURCE: NORMAL
SPECIMEN DESCRIPTION: NORMAL
SPECIMEN DESCRIPTION: NORMAL
TRICHOMONAS VAGINALI, MOLECULAR: NEGATIVE

## 2021-04-29 ENCOUNTER — OFFICE VISIT (OUTPATIENT)
Dept: PRIMARY CARE CLINIC | Age: 41
End: 2021-04-29
Payer: OTHER GOVERNMENT

## 2021-04-29 VITALS
HEART RATE: 84 BPM | WEIGHT: 231.4 LBS | BODY MASS INDEX: 34.27 KG/M2 | SYSTOLIC BLOOD PRESSURE: 128 MMHG | HEIGHT: 69 IN | OXYGEN SATURATION: 94 % | DIASTOLIC BLOOD PRESSURE: 78 MMHG

## 2021-04-29 DIAGNOSIS — R36.0 PENILE DISCHARGE, WITHOUT BLOOD: Primary | ICD-10-CM

## 2021-04-29 PROCEDURE — 99213 OFFICE O/P EST LOW 20 MIN: CPT | Performed by: NURSE PRACTITIONER

## 2021-04-29 ASSESSMENT — ENCOUNTER SYMPTOMS
RESPIRATORY NEGATIVE: 1
ALLERGIC/IMMUNOLOGIC NEGATIVE: 1
EYES NEGATIVE: 1
GASTROINTESTINAL NEGATIVE: 1

## 2021-04-29 NOTE — PROGRESS NOTES
Constitutional: Negative. HENT: Negative. Eyes: Negative. Respiratory: Negative. Cardiovascular: Negative. Gastrointestinal: Negative. Endocrine: Negative. Genitourinary: Positive for discharge. Negative for dysuria and urgency. Musculoskeletal: Negative. Skin: Negative. Allergic/Immunologic: Negative. Neurological: Negative. Hematological: Negative. Psychiatric/Behavioral: Negative. All other systems reviewed and are negative. /78   Pulse 84   Ht 5' 9\" (1.753 m)   Wt 231 lb 6.4 oz (105 kg)   SpO2 94%   BMI 34.17 kg/m²   Physical Exam  Vitals signs and nursing note reviewed. Constitutional:       Appearance: Normal appearance. Cardiovascular:      Rate and Rhythm: Normal rate and regular rhythm. Pulses: Normal pulses. Heart sounds: Normal heart sounds. Pulmonary:      Effort: Pulmonary effort is normal.   Abdominal:      General: Abdomen is flat. Bowel sounds are normal.      Palpations: Abdomen is soft. Genitourinary:     Pubic Area: No rash. Penis: Circumcised. Discharge (white, thin, pt able to reproduce) present. No erythema, tenderness, swelling or lesions. Neurological:      Mental Status: He is alert.        No results found for: WBC, HGB, HCT, MCV, PLT  Lab Results   Component Value Date     01/30/2021    K 5.0 01/30/2021     01/30/2021    CO2 26 01/30/2021    BUN 21 01/30/2021    CREATININE 1.01 01/30/2021    CALCIUM 9.9 01/30/2021      Lab Results   Component Value Date    ALT 25 01/30/2021    AST 26 01/30/2021    ALKPHOS 51 01/30/2021    BILITOT 0.41 01/30/2021     No results found for: TSHFT4, TSH  Lab Results   Component Value Date    CHOL 152 01/30/2021     Lab Results   Component Value Date    TRIG 80 01/30/2021     Lab Results   Component Value Date    HDL 48 01/30/2021     Lab Results   Component Value Date    LDLCHOLESTEROL 88 01/30/2021     Lab Results   Component Value Date    CHOLHDLRATIO 3.2 01/30/2021     No results found for: LABA1C  No results found for: [id number]  No results found for: FOLATE  No results found for: IRON, TIBC, FERRITIN  No results found for: VITD25  I personally reviewed testing with patient. Otherwise labs within normal limits  ASSESSMENT AND PLAN  1. Penile discharge, without blood    - Culture, Genital; Future     Orders Placed This Encounter   Procedures    Culture, Genital     Standing Status:   Future     Standing Expiration Date:   4/29/2022     No orders of the defined types were placed in this encounter. Data Unavailable  Health Maintenance Due   Topic Date Due    COVID-19 Vaccine (1) Never done      There are no discontinued medications. The patient's past medical, surgical, social, and family history as well as his   current medications and allergies were reviewed as documented in today's encounter. Medications, labs, diagnostic studies, consultations and follow-up as documented in this encounter. Return if symptoms worsen or fail to improve. No future appointments. This note was completed by using the assistance of a speech-recognition program. However, inadvertent computerized transcription errors may be present. Although every effort was made to ensure accuracy, no guarantees can be provided that every mistake has been identified and corrected by editing.   Electronically signed by Terrell Aschoff, APRN - CNP on 4/29/2021 at 9:08 AM

## 2021-04-30 ENCOUNTER — HOSPITAL ENCOUNTER (OUTPATIENT)
Age: 41
Setting detail: SPECIMEN
Discharge: HOME | End: 2021-04-30
Payer: OTHER GOVERNMENT

## 2021-04-30 DIAGNOSIS — R36.0 PENILE DISCHARGE, WITHOUT BLOOD: ICD-10-CM

## 2021-05-03 LAB
CULTURE: ABNORMAL
CULTURE: ABNORMAL
Lab: ABNORMAL
SPECIMEN DESCRIPTION: ABNORMAL

## 2021-05-04 DIAGNOSIS — R36.0: Primary | ICD-10-CM
